# Patient Record
Sex: FEMALE | Race: WHITE | NOT HISPANIC OR LATINO | URBAN - METROPOLITAN AREA
[De-identification: names, ages, dates, MRNs, and addresses within clinical notes are randomized per-mention and may not be internally consistent; named-entity substitution may affect disease eponyms.]

---

## 2022-04-30 ENCOUNTER — TELEPHONE ENCOUNTER (OUTPATIENT)
Dept: URBAN - METROPOLITAN AREA CLINIC 121 | Facility: CLINIC | Age: 87
End: 2022-04-30

## 2022-04-30 RX ORDER — ASPIRIN 81 MG/1
TABLET ORAL
OUTPATIENT
Start: 2012-04-27

## 2022-04-30 RX ORDER — LISINOPRIL 20 MG/1
Q AM TABLET ORAL
OUTPATIENT
Start: 2012-04-27

## 2022-05-01 ENCOUNTER — TELEPHONE ENCOUNTER (OUTPATIENT)
Dept: URBAN - METROPOLITAN AREA CLINIC 121 | Facility: CLINIC | Age: 87
End: 2022-05-01

## 2022-05-01 RX ORDER — ERGOCALCIFEROL (VITAMIN D2) 10 MCG
QD TABLET ORAL
Status: ACTIVE | COMMUNITY
Start: 2012-05-17

## 2022-05-01 RX ORDER — LEVOTHYROXINE SODIUM 50 UG/1
QD TABLET ORAL
Status: ACTIVE | COMMUNITY
Start: 2012-04-27

## 2022-05-01 RX ORDER — SAW PALMETTO 160 MG
CAPSULE ORAL
Status: ACTIVE | COMMUNITY
Start: 2016-10-27

## 2022-05-01 RX ORDER — FELODIPINE 2.5 MG/1
Q PM TABLET, FILM COATED ORAL
Status: ACTIVE | COMMUNITY
Start: 2012-04-27

## 2022-05-01 RX ORDER — ASPIRIN 81 MG/1
QD TABLET ORAL
Status: ACTIVE | COMMUNITY
Start: 2012-05-17

## 2022-05-01 RX ORDER — LISINOPRIL AND HYDROCHLOROTHIAZIDE TABLETS 20; 12.5 MG/1; MG/1
TABLET ORAL
Status: ACTIVE | COMMUNITY
Start: 2016-10-27

## 2022-05-01 RX ORDER — MULTIVIT-MIN/FOLIC/VIT K/LYCOP 400-300MCG
TABLET ORAL
Status: ACTIVE | COMMUNITY
Start: 2016-10-27

## 2023-07-24 ENCOUNTER — WEB ENCOUNTER (OUTPATIENT)
Dept: URBAN - METROPOLITAN AREA CLINIC 27 | Facility: CLINIC | Age: 88
End: 2023-07-24

## 2023-08-01 ENCOUNTER — OFFICE VISIT (OUTPATIENT)
Dept: URBAN - METROPOLITAN AREA TELEHEALTH 2 | Facility: TELEHEALTH | Age: 88
End: 2023-08-01
Payer: MEDICARE

## 2023-08-01 ENCOUNTER — DASHBOARD ENCOUNTERS (OUTPATIENT)
Age: 88
End: 2023-08-01

## 2023-08-01 DIAGNOSIS — R19.7 ACUTE DIARRHEA: ICD-10-CM

## 2023-08-01 PROCEDURE — 99203 OFFICE O/P NEW LOW 30 MIN: CPT | Performed by: INTERNAL MEDICINE

## 2023-08-01 RX ORDER — FELODIPINE 2.5 MG/1
Q PM TABLET, FILM COATED ORAL
Status: ACTIVE | COMMUNITY
Start: 2012-04-27

## 2023-08-01 RX ORDER — ERGOCALCIFEROL (VITAMIN D2) 10 MCG
QD TABLET ORAL
Status: ACTIVE | COMMUNITY
Start: 2012-05-17

## 2023-08-01 RX ORDER — LISINOPRIL AND HYDROCHLOROTHIAZIDE TABLETS 20; 12.5 MG/1; MG/1
TABLET ORAL
Status: ACTIVE | COMMUNITY
Start: 2016-10-27

## 2023-08-01 RX ORDER — MULTIVIT-MIN/FOLIC/VIT K/LYCOP 400-300MCG
TABLET ORAL
Status: ACTIVE | COMMUNITY
Start: 2016-10-27

## 2023-08-01 RX ORDER — ASPIRIN 81 MG/1
QD TABLET ORAL
Status: ACTIVE | COMMUNITY
Start: 2012-05-17

## 2023-08-01 RX ORDER — SAW PALMETTO 160 MG
CAPSULE ORAL
Status: ACTIVE | COMMUNITY
Start: 2016-10-27

## 2023-08-01 RX ORDER — LEVOTHYROXINE SODIUM 50 UG/1
QD TABLET ORAL
Status: ACTIVE | COMMUNITY
Start: 2012-04-27

## 2023-08-01 NOTE — HPI-TODAY'S VISIT:
This is a 89-year-old female seen via telehealth for 2 years of watery stools although she has had watery stools in the past.  She was seen in the hospital last week for low potassium and magnesium which were repleted.  She took Imodium over the weekend and did not have a bowel movement for 2 days but then last evening had a bowel movement and a small one today.  According to her daughters bulking has not helped.  Probiotics have not helped.  They have tried multiple medications over the past few years which do not help but Imodium seems to help a little bit.  She was taking it 3 times a week then every other day then a half a pill every night now a pill every night but she did go a couple days without a bowel movement taking it this way.  No bleeding or weight loss and otherwise feels well

## 2023-08-21 ENCOUNTER — OFFICE VISIT (OUTPATIENT)
Dept: URBAN - METROPOLITAN AREA TELEHEALTH 2 | Facility: TELEHEALTH | Age: 88
End: 2023-08-21

## 2023-09-06 ENCOUNTER — TELEPHONE ENCOUNTER (OUTPATIENT)
Dept: URBAN - METROPOLITAN AREA CLINIC 27 | Facility: CLINIC | Age: 88
End: 2023-09-06

## 2024-01-05 ENCOUNTER — TELEPHONE ENCOUNTER (OUTPATIENT)
Dept: URBAN - METROPOLITAN AREA CLINIC 27 | Facility: CLINIC | Age: 89
End: 2024-01-05

## 2024-01-16 ENCOUNTER — TELEPHONE ENCOUNTER (OUTPATIENT)
Dept: URBAN - METROPOLITAN AREA CLINIC 27 | Facility: CLINIC | Age: 89
End: 2024-01-16

## 2024-05-07 ENCOUNTER — TELEPHONE ENCOUNTER (OUTPATIENT)
Dept: URBAN - METROPOLITAN AREA CLINIC 27 | Facility: CLINIC | Age: 89
End: 2024-05-07